# Patient Record
Sex: MALE | URBAN - METROPOLITAN AREA
[De-identification: names, ages, dates, MRNs, and addresses within clinical notes are randomized per-mention and may not be internally consistent; named-entity substitution may affect disease eponyms.]

---

## 2022-12-08 ENCOUNTER — HOSPITAL ENCOUNTER (EMERGENCY)
Facility: HOSPITAL | Age: 47
Discharge: LEFT BEFORE DISCHARGE | End: 2022-12-08

## 2022-12-08 VITALS
HEART RATE: 90 BPM | DIASTOLIC BLOOD PRESSURE: 105 MMHG | TEMPERATURE: 98 F | SYSTOLIC BLOOD PRESSURE: 165 MMHG | RESPIRATION RATE: 20 BRPM | OXYGEN SATURATION: 97 %

## 2022-12-08 NOTE — ED INITIAL ASSESSMENT (HPI)
Patient arrives through triage with c/o of hypertension. Patient states it is usually well controlled, but states that around 1830 it became high.